# Patient Record
Sex: FEMALE | Race: WHITE | NOT HISPANIC OR LATINO | Employment: OTHER | URBAN - METROPOLITAN AREA
[De-identification: names, ages, dates, MRNs, and addresses within clinical notes are randomized per-mention and may not be internally consistent; named-entity substitution may affect disease eponyms.]

---

## 2023-09-15 ENCOUNTER — HOSPITAL ENCOUNTER (EMERGENCY)
Facility: MEDICAL CENTER | Age: 58
End: 2023-09-16
Attending: STUDENT IN AN ORGANIZED HEALTH CARE EDUCATION/TRAINING PROGRAM
Payer: COMMERCIAL

## 2023-09-15 ENCOUNTER — APPOINTMENT (OUTPATIENT)
Dept: RADIOLOGY | Facility: MEDICAL CENTER | Age: 58
End: 2023-09-15
Attending: STUDENT IN AN ORGANIZED HEALTH CARE EDUCATION/TRAINING PROGRAM
Payer: COMMERCIAL

## 2023-09-15 VITALS
HEIGHT: 63 IN | OXYGEN SATURATION: 97 % | TEMPERATURE: 97.6 F | HEART RATE: 75 BPM | SYSTOLIC BLOOD PRESSURE: 147 MMHG | RESPIRATION RATE: 16 BRPM | DIASTOLIC BLOOD PRESSURE: 96 MMHG | BODY MASS INDEX: 23.21 KG/M2 | WEIGHT: 131 LBS

## 2023-09-15 DIAGNOSIS — M25.571 ACUTE RIGHT ANKLE PAIN: ICD-10-CM

## 2023-09-15 DIAGNOSIS — S93.491A SPRAIN OF ANTERIOR TALOFIBULAR LIGAMENT OF RIGHT ANKLE, INITIAL ENCOUNTER: ICD-10-CM

## 2023-09-15 PROCEDURE — A9270 NON-COVERED ITEM OR SERVICE: HCPCS | Performed by: STUDENT IN AN ORGANIZED HEALTH CARE EDUCATION/TRAINING PROGRAM

## 2023-09-15 PROCEDURE — 700102 HCHG RX REV CODE 250 W/ 637 OVERRIDE(OP): Performed by: STUDENT IN AN ORGANIZED HEALTH CARE EDUCATION/TRAINING PROGRAM

## 2023-09-15 PROCEDURE — 99284 EMERGENCY DEPT VISIT MOD MDM: CPT

## 2023-09-15 PROCEDURE — 73610 X-RAY EXAM OF ANKLE: CPT | Mod: RT

## 2023-09-15 RX ORDER — ACETAMINOPHEN 500 MG
1000 TABLET ORAL ONCE
Status: COMPLETED | OUTPATIENT
Start: 2023-09-15 | End: 2023-09-15

## 2023-09-15 RX ADMIN — ACETAMINOPHEN 1000 MG: 500 TABLET ORAL at 23:19

## 2023-09-16 NOTE — DISCHARGE INSTRUCTIONS
As we discussed your x-ray does not show any broken bones.  You likely have a sprain of your ankle ligament.  You should wear your air splint and use crutches to help take weight off of this ligament.  Follow-up with orthopedic surgery on an outpatient basis.  Take Tylenol and ibuprofen as needed for continued pain.    Return the emergency department with any new numbness or weakness of your foot or ankle, severe pain or any new concerning symptoms

## 2023-09-16 NOTE — ED PROVIDER NOTES
ED Provider Note    CHIEF COMPLAINT  Chief Complaint   Patient presents with    GLF     Pt was coming down stairs and foot slipped out and she fell onto right side and thought she heard a crunch in her right ankle. Pt fell down 2 stairs, denies hitting head, loc or blood thinners.     Ankle Pain     Right ankle pain, slight swelling noted to right ankle.        EXTERNAL RECORDS REVIEWED  Other unavailable at the time of evaluation    HPI/ROS  LIMITATION TO HISTORY   Select: : None  OUTSIDE HISTORIAN(S):  Family  at bedside    Jamison Fuller is a 57 y.o. female who presents to the emergency department for evaluation of right ankle pain.  Patient reports that she slipped down 2 stairs and landed on her inverted right ankle at which time she heard an audible popping sensation and experienced pain to the lateral ankle.  She states that she has been able to bear weight on the extremity since that time with increasing pain and swelling to the lateral ankle.  She denies any numbness or weakness of the extremity, knee pain, hip pain.  She did not strike her head, lose consciousness or sustain any additional injuries.    PAST MEDICAL HISTORY   has a past medical history of Hyperthyroidism.    SURGICAL HISTORY  patient denies any surgical history    FAMILY HISTORY  History reviewed. No pertinent family history.    SOCIAL HISTORY  Social History     Tobacco Use    Smoking status: Never    Smokeless tobacco: Never   Vaping Use    Vaping Use: Never used   Substance and Sexual Activity    Alcohol use: Not Currently    Drug use: Never    Sexual activity: Not on file       CURRENT MEDICATIONS  Home Medications       Reviewed by Foster Casillas R.N. (Registered Nurse) on 09/15/23 at 0417  Med List Status: Not Addressed     Medication Last Dose Status        Patient Antonio Taking any Medications                           ALLERGIES  No Known Allergies    PHYSICAL EXAM  VITAL SIGNS: BP (!) 147/96   Pulse 75   Temp 36.4 °C (97.6  "°F) (Temporal)   Resp 16   Ht 1.6 m (5' 3\")   Wt 59.4 kg (131 lb)   SpO2 97%   Breastfeeding No   BMI 23.21 kg/m²    Constitutional: No acute distress.  Cardiovascular: 2+ dorsalis pedis and posterior tibial pulses on the right and capillary refill less than 2 seconds in all distal toes of the right foot.  Musculoskeletal: Mild edema and tenderness over the anterolateral right upper foot inferior to the right lateral malleolus.  Tenderness most significant over the ATFL tendon.  Normal range of motion of the ankle and foot.  No bony deformity or tenderness to the proximal leg or fibula, knee, thigh or hip and normal range of motion at these joints.  Skin: Warm, Dry, No erythema, No rash.   Neurologic: Alert and oriented x 3, 5 out of 5 strength with dorsi and plantarflexion and normal sensation in all nerve distributions of the right foot.  Psychiatric: Appropriate affect for situation      DIAGNOSTIC STUDIES / PROCEDURES    RADIOLOGY  I have independently interpreted the diagnostic imaging associated with this visit and am waiting the final reading from the radiologist.   My preliminary interpretation is as follows: No obvious bony fracture or dislocation apparent.    Radiologist interpretation:   DX-ANKLE 3+ VIEWS RIGHT   Final Result         1.  No acute traumatic bony injury.             COURSE & MEDICAL DECISION MAKING    ED Observation Status? No; Patient does not meet criteria for ED Observation.     INITIAL ASSESSMENT, COURSE AND PLAN  Care Narrative:     Patient arrives to the emergency department for evaluation of right lateral ankle pain after an inversion injury.  Extremity is neurovascularly intact.  There is tenderness over the ATFL tendon and I suspect ATFL rupture.  We will obtain an x-ray to further assess for fibular fracture.  Low clinical concern for medial malleolus or posterior malleolus fracture given examination.  No additional evidence of traumatic injury on examination.  Will provide " oral Tylenol for analgesia.    X-ray of the right ankle with no acute bony injury identified.  Suspect ATFL rupture.  We will place the patient into an air gel splint, provide crutches and refer for outpatient orthopedic surgery.    This was discussed with the patient who is feeling better after Tylenol.  Air splint provided.  Return precautions discussed and all questions answered and the patient was discharged in stable condition.      ADDITIONAL PROBLEM LIST  ATFL sprain    DISPOSITION AND DISCUSSIONS  I have discussed management of the patient with the following physicians and TRE's:  None    Discussion of management with other Q[facility] or appropriate source(s): None       FINAL IMPRESSION  1. Sprain of anterior talofibular ligament of right ankle, initial encounter    2. Acute right ankle pain        PRESCRIPTIONS  New Prescriptions    No medications on file       FOLLOW UP  [facility], Emergency Dept  [address]  [facility] [address]  [phone]    As needed, If symptoms worsen    Trey Davis M.D.  [address] [facility] [address]  [phone]    Schedule an appointment as soon as possible for a visit           -DISCHARGE-      Electronically signed by: Galo Nagy M.D., 9/15/2023 10:42 PM

## 2023-09-16 NOTE — ED TRIAGE NOTES
"Bib  for above complaints.     Chief Complaint   Patient presents with    GLF     Pt was coming down stairs and foot slipped out and she fell onto right side and thought she heard a crunch in her right ankle. Pt fell down 2 stairs, denies hitting head, loc or blood thinners.     Ankle Pain     Right ankle pain, slight swelling noted to right ankle.      BP (!) 147/96   Pulse 75   Temp 36.4 °C (97.6 °F) (Temporal)   Resp 16   Ht 1.6 m (5' 3\")   Wt 59.4 kg (131 lb)   SpO2 97%   Breastfeeding No   BMI 23.21 kg/m²     "

## 2024-02-02 ENCOUNTER — NEW PATIENT (OUTPATIENT)
Dept: URBAN - METROPOLITAN AREA CLINIC 29 | Facility: CLINIC | Age: 59
End: 2024-02-02

## 2024-02-02 DIAGNOSIS — H52.02: ICD-10-CM

## 2024-02-02 DIAGNOSIS — H52.4: ICD-10-CM

## 2024-02-02 DIAGNOSIS — H40.013: ICD-10-CM

## 2024-02-02 DIAGNOSIS — H25.13: ICD-10-CM

## 2024-02-02 PROCEDURE — 99204 OFFICE O/P NEW MOD 45 MIN: CPT

## 2024-02-02 PROCEDURE — 92250 FUNDUS PHOTOGRAPHY W/I&R: CPT

## 2024-02-02 PROCEDURE — 92015 DETERMINE REFRACTIVE STATE: CPT

## 2024-02-02 ASSESSMENT — TONOMETRY
OD_IOP_MMHG: 17
OS_IOP_MMHG: 15

## 2024-02-02 ASSESSMENT — VISUAL ACUITY
OS_CC: J1+-1
OD_SC: J2-3
OS_SC: J16
OD_PH: 20/20-1
OD_CC: J1+-1
OD_SC: 20/60-1
OS_SC: 20/25-2

## 2025-04-25 ENCOUNTER — COMPREHENSIVE EXAM (OUTPATIENT)
Age: 60
End: 2025-04-25

## 2025-04-25 DIAGNOSIS — H52.11: ICD-10-CM

## 2025-04-25 DIAGNOSIS — H25.13: ICD-10-CM

## 2025-04-25 DIAGNOSIS — H40.013: ICD-10-CM

## 2025-04-25 DIAGNOSIS — H52.223: ICD-10-CM

## 2025-04-25 DIAGNOSIS — H52.02: ICD-10-CM

## 2025-04-25 PROCEDURE — 92133 CPTRZD OPH DX IMG PST SGM ON: CPT

## 2025-04-25 PROCEDURE — 92015 DETERMINE REFRACTIVE STATE: CPT

## 2025-04-25 PROCEDURE — 99214 OFFICE O/P EST MOD 30 MIN: CPT
